# Patient Record
Sex: MALE | Employment: OTHER | URBAN - METROPOLITAN AREA
[De-identification: names, ages, dates, MRNs, and addresses within clinical notes are randomized per-mention and may not be internally consistent; named-entity substitution may affect disease eponyms.]

---

## 2017-01-09 ENCOUNTER — DOCTOR'S OFFICE (OUTPATIENT)
Dept: URBAN - METROPOLITAN AREA CLINIC 137 | Facility: CLINIC | Age: 72
Setting detail: OPHTHALMOLOGY
End: 2017-01-09
Payer: MEDICARE

## 2017-01-09 DIAGNOSIS — H25.13: ICD-10-CM

## 2017-01-09 PROCEDURE — 92014 COMPRE OPH EXAM EST PT 1/>: CPT | Performed by: OPHTHALMOLOGY

## 2017-01-09 ASSESSMENT — REFRACTION_MANIFEST
OD_AXIS: 144
OS_VA1: 20/25
OS_VA1: 20/
OD_VA1: 20/
OD_ADD: +2.50
OD_VA3: 20/
OD_VA1: 20/
OS_SPHERE: +1.75
OD_VA3: 20/
OU_VA: 20/
OS_ADD: +2.50
OD_CYLINDER: +2.50
OD_SPHERE: +1.50
OD_VA3: 20/
OS_VA2: 20/
OS_VA2: 20/
OD_VA2: 20/
OS_CYLINDER: +1.50
OD_VA1: 20/30
OS_VA3: 20/
OS_AXIS: 044
OU_VA: 20/
OD_VA2: 20/
OS_VA3: 20/
OS_VA3: 20/
OU_VA: 20/
OD_VA2: 20/
OS_VA1: 20/
OS_VA2: 20/

## 2017-01-09 ASSESSMENT — REFRACTION_CURRENTRX
OD_OVR_VA: 20/
OD_OVR_VA: 20/
OD_SPHERE: +1.50
OS_OVR_VA: 20/
OS_SPHERE: +1.75
OD_ADD: +2.50
OS_AXIS: 44
OD_CYLINDER: +2.50
OS_ADD: +2.50
OS_OVR_VA: 20/
OS_CYLINDER: +1.50
OD_OVR_VA: 20/
OS_OVR_VA: 20/
OD_AXIS: 144

## 2017-01-09 ASSESSMENT — SPHEQUIV_DERIVED
OD_SPHEQUIV: 2.75
OD_SPHEQUIV: 3.25
OS_SPHEQUIV: 2.5
OS_SPHEQUIV: 2.75

## 2017-01-09 ASSESSMENT — REFRACTION_AUTOREFRACTION
OS_SPHERE: +2.00
OS_AXIS: 45
OD_SPHERE: +2.00
OS_CYLINDER: +1.50
OD_CYLINDER: +2.50
OD_AXIS: 134

## 2017-01-09 ASSESSMENT — CONFRONTATIONAL VISUAL FIELD TEST (CVF)
OS_FINDINGS: FULL
OD_FINDINGS: FULL

## 2017-01-09 ASSESSMENT — VISUAL ACUITY
OD_BCVA: 20/20-2
OS_BCVA: 20/30-2

## 2018-03-06 ENCOUNTER — DOCTOR'S OFFICE (OUTPATIENT)
Dept: URBAN - METROPOLITAN AREA CLINIC 137 | Facility: CLINIC | Age: 73
Setting detail: OPHTHALMOLOGY
End: 2018-03-06
Payer: COMMERCIAL

## 2018-03-06 DIAGNOSIS — H52.4: ICD-10-CM

## 2018-03-06 PROCEDURE — 92014 COMPRE OPH EXAM EST PT 1/>: CPT | Performed by: OPHTHALMOLOGY

## 2018-03-06 ASSESSMENT — REFRACTION_MANIFEST
OU_VA: 20/
OS_CYLINDER: +1.50
OU_VA: 20/
OD_VA3: 20/
OS_AXIS: 044
OS_VA2: 20/
OD_SPHERE: +1.50
OD_VA3: 20/
OS_VA2: 20/
OD_VA2: 20/
OD_VA1: 20/30
OS_ADD: +2.50
OD_CYLINDER: +2.50
OS_SPHERE: +1.75
OD_VA2: 20/
OS_VA1: 20/25
OD_VA1: 20/
OS_VA3: 20/
OS_VA1: 20/
OS_VA3: 20/
OD_ADD: +2.50
OD_AXIS: 144

## 2018-03-06 ASSESSMENT — REFRACTION_CURRENTRX
OS_OVR_VA: 20/
OS_OVR_VA: 20/
OD_VPRISM_DIRECTION: BF
OS_OVR_VA: 20/
OD_OVR_VA: 20/
OD_CYLINDER: +2.50
OS_SPHERE: +1.75
OD_OVR_VA: 20/
OS_ADD: +2.50
OS_AXIS: 44
OD_ADD: +2.50
OD_SPHERE: +1.50
OS_CYLINDER: +1.50
OD_AXIS: 144
OD_OVR_VA: 20/
OS_VPRISM_DIRECTION: BF

## 2018-03-06 ASSESSMENT — REFRACTION_OUTSIDERX
OD_AXIS: 140
OS_VA2: 20/20(J1+)
OD_SPHERE: +1.50
OD_ADD: +2.50
OD_VA2: 20/20(J1+)
OS_AXIS: 040
OD_VA1: 20/30+3
OS_ADD: +2.50
OU_VA: 20/20
OD_VA3: 20/
OS_VA3: 20/
OS_VA1: 20/20-3
OS_CYLINDER: +1.50
OD_CYLINDER: +2.75
OS_SPHERE: +1.75

## 2018-03-06 ASSESSMENT — REFRACTION_AUTOREFRACTION
OD_SPHERE: +2.00
OS_CYLINDER: +1.50
OS_SPHERE: +1.75
OS_AXIS: 039
OD_AXIS: 139
OD_CYLINDER: +2.75

## 2018-03-06 ASSESSMENT — CONFRONTATIONAL VISUAL FIELD TEST (CVF)
OD_FINDINGS: FULL
OS_FINDINGS: FULL

## 2018-03-06 ASSESSMENT — SPHEQUIV_DERIVED
OD_SPHEQUIV: 2.75
OS_SPHEQUIV: 2.5
OS_SPHEQUIV: 2.5
OD_SPHEQUIV: 3.375

## 2018-03-06 ASSESSMENT — VISUAL ACUITY
OS_BCVA: 20/30+1
OD_BCVA: 20/20-2

## 2018-09-24 ENCOUNTER — DOCTOR'S OFFICE (OUTPATIENT)
Dept: URBAN - METROPOLITAN AREA CLINIC 137 | Facility: CLINIC | Age: 73
Setting detail: OPHTHALMOLOGY
End: 2018-09-24
Payer: MEDICARE

## 2018-09-24 DIAGNOSIS — H25.13: ICD-10-CM

## 2018-09-24 PROCEDURE — 92014 COMPRE OPH EXAM EST PT 1/>: CPT | Performed by: OPHTHALMOLOGY

## 2018-09-24 ASSESSMENT — REFRACTION_CURRENTRX
OD_OVR_VA: 20/
OD_AXIS: 144
OS_AXIS: 44
OS_SPHERE: +1.75
OS_VPRISM_DIRECTION: BF
OS_OVR_VA: 20/
OD_OVR_VA: 20/
OS_CYLINDER: +1.50
OD_ADD: +2.50
OD_VPRISM_DIRECTION: BF
OD_SPHERE: +1.50
OS_ADD: +2.50
OD_CYLINDER: +2.50
OD_OVR_VA: 20/

## 2018-09-24 ASSESSMENT — REFRACTION_MANIFEST
OS_VA3: 20/
OD_VA1: 20/30+3
OS_AXIS: 040
OS_VA1: 20/20-3
OS_VA3: 20/
OD_CYLINDER: +2.75
OS_SPHERE: +1.75
OD_CYLINDER: +2.50
OS_VA1: 20/25
OU_VA: 20/20
OD_VA2: 20/20(J1+)
OD_SPHERE: +1.50
OD_AXIS: 144
OD_VA3: 20/
OD_VA2: 20/
OU_VA: 20/
OS_VA2: 20/
OD_VA3: 20/
OD_SPHERE: +1.50
OS_ADD: +2.50
OD_ADD: +2.50
OD_VA1: 20/30
OS_ADD: +2.50
OD_AXIS: 140
OS_CYLINDER: +1.50
OS_SPHERE: +1.75
OD_ADD: +2.50
OS_AXIS: 044
OS_CYLINDER: +1.50
OS_VA2: 20/20(J1+)

## 2018-09-24 ASSESSMENT — REFRACTION_AUTOREFRACTION
OS_AXIS: 43
OD_CYLINDER: +2.50
OD_AXIS: 139
OS_SPHERE: +2.25
OD_SPHERE: +2.75
OS_CYLINDER: +2.00

## 2018-09-24 ASSESSMENT — CONFRONTATIONAL VISUAL FIELD TEST (CVF)
OD_FINDINGS: FULL
OS_FINDINGS: FULL

## 2018-09-24 ASSESSMENT — SPHEQUIV_DERIVED
OD_SPHEQUIV: 2.875
OS_SPHEQUIV: 2.5
OD_SPHEQUIV: 4
OS_SPHEQUIV: 2.5
OS_SPHEQUIV: 3.25
OD_SPHEQUIV: 2.75

## 2018-09-24 ASSESSMENT — VISUAL ACUITY
OD_BCVA: 20/30-1
OS_BCVA: 20/40-1

## 2019-04-08 RX ORDER — ATORVASTATIN CALCIUM 40 MG/1
40 TABLET, FILM COATED ORAL DAILY
COMMUNITY

## 2019-04-08 RX ORDER — ASPIRIN 81 MG/1
81 TABLET ORAL DAILY
COMMUNITY

## 2019-04-08 RX ORDER — METOPROLOL SUCCINATE 50 MG/1
25 TABLET, EXTENDED RELEASE ORAL DAILY
COMMUNITY

## 2019-04-11 ENCOUNTER — ANESTHESIA EVENT (OUTPATIENT)
Dept: PERIOP | Facility: HOSPITAL | Age: 74
End: 2019-04-11
Payer: COMMERCIAL

## 2019-04-12 ENCOUNTER — HOSPITAL ENCOUNTER (OUTPATIENT)
Facility: HOSPITAL | Age: 74
Setting detail: OUTPATIENT SURGERY
Discharge: HOME | End: 2019-04-12
Attending: SPECIALIST | Admitting: SPECIALIST
Payer: COMMERCIAL

## 2019-04-12 ENCOUNTER — ANESTHESIA (OUTPATIENT)
Dept: PERIOP | Facility: HOSPITAL | Age: 74
End: 2019-04-12
Payer: COMMERCIAL

## 2019-04-12 VITALS
SYSTOLIC BLOOD PRESSURE: 117 MMHG | DIASTOLIC BLOOD PRESSURE: 77 MMHG | RESPIRATION RATE: 18 BRPM | OXYGEN SATURATION: 96 % | TEMPERATURE: 96.9 F | HEART RATE: 55 BPM

## 2019-04-12 DEVICE — IMPLANTABLE DEVICE: Type: IMPLANTABLE DEVICE | Site: EAR | Status: FUNCTIONAL

## 2019-04-12 RX ORDER — ONDANSETRON 2 MG/ML
4 INJECTION INTRAMUSCULAR; INTRAVENOUS EVERY 6 HOURS PRN
Status: DISCONTINUED | OUTPATIENT
Start: 2019-04-12 | End: 2019-04-12 | Stop reason: HOSPADM

## 2019-04-12 RX ORDER — DEXAMETHASONE SODIUM PHOSPHATE 10 MG/ML
INJECTION, SOLUTION INTRAMUSCULAR; INTRAVENOUS AS NEEDED
Status: DISCONTINUED | OUTPATIENT
Start: 2019-04-12 | End: 2019-04-12 | Stop reason: SURG

## 2019-04-12 RX ORDER — LIDOCAINE HYDROCHLORIDE 10 MG/ML
1 INJECTION, SOLUTION INFILTRATION; PERINEURAL ONCE
Status: DISCONTINUED | OUTPATIENT
Start: 2019-04-12 | End: 2019-04-12 | Stop reason: HOSPADM

## 2019-04-12 RX ORDER — SODIUM CHLORIDE, SODIUM LACTATE, POTASSIUM CHLORIDE, CALCIUM CHLORIDE 600; 310; 30; 20 MG/100ML; MG/100ML; MG/100ML; MG/100ML
125 INJECTION, SOLUTION INTRAVENOUS CONTINUOUS
Status: DISCONTINUED | OUTPATIENT
Start: 2019-04-12 | End: 2019-04-12 | Stop reason: HOSPADM

## 2019-04-12 RX ORDER — ONDANSETRON 2 MG/ML
4 INJECTION INTRAMUSCULAR; INTRAVENOUS ONCE AS NEEDED
Status: DISCONTINUED | OUTPATIENT
Start: 2019-04-12 | End: 2019-04-12 | Stop reason: HOSPADM

## 2019-04-12 RX ORDER — OFLOXACIN 3 MG/ML
SOLUTION/ DROPS OPHTHALMIC AS NEEDED
Status: DISCONTINUED | OUTPATIENT
Start: 2019-04-12 | End: 2019-04-12 | Stop reason: HOSPADM

## 2019-04-12 RX ORDER — KETOROLAC TROMETHAMINE 30 MG/ML
INJECTION, SOLUTION INTRAMUSCULAR; INTRAVENOUS AS NEEDED
Status: DISCONTINUED | OUTPATIENT
Start: 2019-04-12 | End: 2019-04-12 | Stop reason: SURG

## 2019-04-12 RX ORDER — PROPOFOL 10 MG/ML
INJECTION, EMULSION INTRAVENOUS AS NEEDED
Status: DISCONTINUED | OUTPATIENT
Start: 2019-04-12 | End: 2019-04-12 | Stop reason: SURG

## 2019-04-12 RX ORDER — FENTANYL CITRATE/PF 50 MCG/ML
25 SYRINGE (ML) INJECTION
Status: DISCONTINUED | OUTPATIENT
Start: 2019-04-12 | End: 2019-04-12 | Stop reason: HOSPADM

## 2019-04-12 RX ORDER — ONDANSETRON 2 MG/ML
INJECTION INTRAMUSCULAR; INTRAVENOUS AS NEEDED
Status: DISCONTINUED | OUTPATIENT
Start: 2019-04-12 | End: 2019-04-12 | Stop reason: SURG

## 2019-04-12 RX ORDER — ACETAMINOPHEN 325 MG/1
650 TABLET ORAL EVERY 6 HOURS PRN
Status: DISCONTINUED | OUTPATIENT
Start: 2019-04-12 | End: 2019-04-12 | Stop reason: HOSPADM

## 2019-04-12 RX ORDER — MEPERIDINE HYDROCHLORIDE 25 MG/ML
25 INJECTION INTRAMUSCULAR; INTRAVENOUS; SUBCUTANEOUS AS NEEDED
Status: DISCONTINUED | OUTPATIENT
Start: 2019-04-12 | End: 2019-04-12 | Stop reason: HOSPADM

## 2019-04-12 RX ORDER — SODIUM CHLORIDE, SODIUM LACTATE, POTASSIUM CHLORIDE, CALCIUM CHLORIDE 600; 310; 30; 20 MG/100ML; MG/100ML; MG/100ML; MG/100ML
INJECTION, SOLUTION INTRAVENOUS CONTINUOUS PRN
Status: DISCONTINUED | OUTPATIENT
Start: 2019-04-12 | End: 2019-04-12 | Stop reason: SURG

## 2019-04-12 RX ADMIN — PROPOFOL 180 MG: 10 INJECTION, EMULSION INTRAVENOUS at 09:33

## 2019-04-12 RX ADMIN — SODIUM CHLORIDE, SODIUM LACTATE, POTASSIUM CHLORIDE, AND CALCIUM CHLORIDE: .6; .31; .03; .02 INJECTION, SOLUTION INTRAVENOUS at 08:04

## 2019-04-12 RX ADMIN — KETOROLAC TROMETHAMINE 15 MG: 30 INJECTION, SOLUTION INTRAMUSCULAR at 09:34

## 2019-04-12 RX ADMIN — SODIUM CHLORIDE, SODIUM LACTATE, POTASSIUM CHLORIDE, AND CALCIUM CHLORIDE 125 ML/HR: .6; .31; .03; .02 INJECTION, SOLUTION INTRAVENOUS at 08:07

## 2019-04-12 RX ADMIN — DEXAMETHASONE SODIUM PHOSPHATE 5 MG: 10 INJECTION, SOLUTION INTRAMUSCULAR; INTRAVENOUS at 09:44

## 2019-04-12 RX ADMIN — ONDANSETRON 4 MG: 2 INJECTION INTRAMUSCULAR; INTRAVENOUS at 09:44

## 2019-04-12 RX ADMIN — PROPOFOL 50 MG: 10 INJECTION, EMULSION INTRAVENOUS at 09:37

## 2019-04-12 RX ADMIN — PROPOFOL 100 MG: 10 INJECTION, EMULSION INTRAVENOUS at 09:40

## 2019-04-15 ENCOUNTER — DOCTOR'S OFFICE (OUTPATIENT)
Dept: URBAN - METROPOLITAN AREA CLINIC 137 | Facility: CLINIC | Age: 74
Setting detail: OPHTHALMOLOGY
End: 2019-04-15
Payer: COMMERCIAL

## 2019-04-15 DIAGNOSIS — H52.03: ICD-10-CM

## 2019-04-15 DIAGNOSIS — H52.4: ICD-10-CM

## 2019-04-15 DIAGNOSIS — H52.223: ICD-10-CM

## 2019-04-15 PROCEDURE — 92014 COMPRE OPH EXAM EST PT 1/>: CPT | Performed by: OPTOMETRIST

## 2019-04-15 ASSESSMENT — REFRACTION_MANIFEST
OS_VA1: 20/25
OD_SPHERE: +4.25
OD_ADD: +2.50
OD_CYLINDER: -2.50
OD_SPHERE: +4.75
OD_VA3: 20/
OS_AXIS: 135
OS_VA2: 20/20(J1+)
OS_SPHERE: +3.75
OD_CYLINDER: -2.75
OD_VA2: 20/
OU_VA: 20/20-
OD_VA2: 20/20(J1+)
OS_VA1: 20/25
OS_SPHERE: +3.25
OD_VA3: 20/
OS_SPHERE: +1.75
OS_AXIS: 044
OS_VA2: 20/
OS_VA3: 20/
OS_CYLINDER: -1.75
OD_AXIS: 144
OS_ADD: +2.50
OD_AXIS: 50
OD_ADD: +2.50
OU_VA: 20/
OS_VA3: 20/
OD_VA1: 20/30+3
OD_SPHERE: +1.50
OS_VA1: 20/20-3
OD_VA1: 20/30
OS_ADD: +2.50
OS_CYLINDER: +1.50
OS_AXIS: 130
OD_CYLINDER: +2.50
OD_ADD: +2.50
OD_AXIS: 050
OU_VA: 20/20
OD_VA1: 20/25
OS_CYLINDER: -1.50
OS_ADD: +2.50

## 2019-04-15 ASSESSMENT — CONFRONTATIONAL VISUAL FIELD TEST (CVF)
OD_FINDINGS: FULL
OS_FINDINGS: FULL

## 2019-04-15 ASSESSMENT — REFRACTION_CURRENTRX
OS_SPHERE: +3.25
OD_VPRISM_DIRECTION: BF
OS_VPRISM_DIRECTION: BF
OS_OVR_VA: 20/
OD_SPHERE: +4.00
OS_CYLINDER: -1.50
OS_OVR_VA: 20/
OD_OVR_VA: 20/
OS_OVR_VA: 20/
OS_ADD: +2.50
OD_OVR_VA: 20/
OD_OVR_VA: 20/
OD_CYLINDER: -2.50
OD_ADD: +2.75
OD_AXIS: 52
OS_AXIS: 139

## 2019-04-15 ASSESSMENT — SPHEQUIV_DERIVED
OS_SPHEQUIV: 2.5
OD_SPHEQUIV: 2.875
OD_SPHEQUIV: 2.75
OS_SPHEQUIV: 2.5
OS_SPHEQUIV: 2.875
OD_SPHEQUIV: 3.5
OS_SPHEQUIV: 2.875
OD_SPHEQUIV: 3.5

## 2019-04-15 ASSESSMENT — REFRACTION_AUTOREFRACTION
OD_SPHERE: +4.50
OD_AXIS: 42
OS_SPHERE: +3.75
OD_CYLINDER: -2.00
OS_AXIS: 135
OS_CYLINDER: -1.75

## 2019-04-15 ASSESSMENT — VISUAL ACUITY
OD_BCVA: 20/30-2
OS_BCVA: 20/30

## 2020-09-15 ENCOUNTER — OPTICAL OFFICE (OUTPATIENT)
Dept: URBAN - METROPOLITAN AREA CLINIC 146 | Facility: CLINIC | Age: 75
Setting detail: OPHTHALMOLOGY
End: 2020-09-15
Payer: COMMERCIAL

## 2020-09-15 ENCOUNTER — DOCTOR'S OFFICE (OUTPATIENT)
Dept: URBAN - METROPOLITAN AREA CLINIC 137 | Facility: CLINIC | Age: 75
Setting detail: OPHTHALMOLOGY
End: 2020-09-15
Payer: COMMERCIAL

## 2020-09-15 ENCOUNTER — OPTICAL OFFICE (OUTPATIENT)
Dept: URBAN - METROPOLITAN AREA CLINIC 146 | Facility: CLINIC | Age: 75
Setting detail: OPHTHALMOLOGY
End: 2020-09-15

## 2020-09-15 DIAGNOSIS — H52.03: ICD-10-CM

## 2020-09-15 DIAGNOSIS — H52.223: ICD-10-CM

## 2020-09-15 PROBLEM — H25.13 CATARACT NUCLEAR SCLEROSIS; BOTH EYES: Status: ACTIVE | Noted: 2017-01-09

## 2020-09-15 PROCEDURE — V2799 MISC VISION ITEM OR SERVICE: HCPCS | Performed by: OPTOMETRIST

## 2020-09-15 PROCEDURE — V2020 VISION SVCS FRAMES PURCHASES: HCPCS | Performed by: OPTOMETRIST

## 2020-09-15 PROCEDURE — V2203 LENS SPHCYL BIFOCAL 4.00D/.1: HCPCS | Performed by: OPTOMETRIST

## 2020-09-15 PROCEDURE — V2208 LENS SPHCY BIFOCAL 4.25-7/2.: HCPCS | Performed by: OPTOMETRIST

## 2020-09-15 PROCEDURE — 92014 COMPRE OPH EXAM EST PT 1/>: CPT | Performed by: OPTOMETRIST

## 2020-09-15 PROCEDURE — V2784 LENS POLYCARB OR EQUAL: HCPCS | Performed by: OPTOMETRIST

## 2020-09-15 PROCEDURE — V2744 TINT PHOTOCHROMATIC LENS/ES: HCPCS | Performed by: OPTOMETRIST

## 2020-09-15 ASSESSMENT — REFRACTION_CURRENTRX
OS_ADD: +2.50
OS_AXIS: 139
OD_ADD: +2.75
OS_SPHERE: +3.25
OD_VPRISM_DIRECTION: BF
OD_CYLINDER: -2.50
OD_AXIS: 52
OD_OVR_VA: 20/
OS_CYLINDER: -1.50
OS_VPRISM_DIRECTION: BF
OS_OVR_VA: 20/
OD_SPHERE: +4.00

## 2020-09-15 ASSESSMENT — REFRACTION_MANIFEST
OD_VA1: 20/25
OD_ADD: +2.50
OD_SPHERE: +5.25
OS_CYLINDER: -1.75
OS_SPHERE: +3.50
OS_ADD: +2.50
OS_CYLINDER: -1.50
OS_ADD: +2.50
OS_AXIS: 135
OD_ADD: +2.50
OU_VA: 20/20-
OS_AXIS: 130
OU_VA: 20/20
OD_AXIS: 50
OD_ADD: +2.50
OS_VA1: 20/20-3
OD_VA2: 20/20(J1+)
OD_SPHERE: +4.75
OD_CYLINDER: -2.50
OS_AXIS: 130
OD_VA1: 20/25
OS_VA1: 20/25
OS_VA1: 20/25
OS_SPHERE: +3.25
OD_SPHERE: +4.25
OS_VA2: 20/20(J1+)
OD_VA1: 20/30+3
OD_AXIS: 50
OD_CYLINDER: -2.75
OS_SPHERE: +3.75
OS_CYLINDER: -1.50
OD_AXIS: 050
OD_CYLINDER: -2.50
OS_ADD: +2.50

## 2020-09-15 ASSESSMENT — SPHEQUIV_DERIVED
OD_SPHEQUIV: 4
OD_SPHEQUIV: 3.5
OS_SPHEQUIV: 2.5
OS_SPHEQUIV: 2.875
OS_SPHEQUIV: 2.75
OD_SPHEQUIV: 2.875
OD_SPHEQUIV: 4
OS_SPHEQUIV: 3.625

## 2020-09-15 ASSESSMENT — REFRACTION_AUTOREFRACTION
OS_CYLINDER: -1.25
OS_SPHERE: +4.25
OD_AXIS: 45
OS_AXIS: 132
OD_SPHERE: +5.00
OD_CYLINDER: -2.00

## 2020-09-15 ASSESSMENT — CONFRONTATIONAL VISUAL FIELD TEST (CVF)
OD_FINDINGS: FULL
OS_FINDINGS: FULL

## 2020-09-15 ASSESSMENT — VISUAL ACUITY
OD_BCVA: 20/30-1
OS_BCVA: 20/40

## 2021-09-20 PROCEDURE — 88305 TISSUE EXAM BY PATHOLOGIST: CPT | Performed by: PATHOLOGY

## 2021-09-21 ENCOUNTER — LAB REQUISITION (OUTPATIENT)
Dept: LAB | Facility: HOSPITAL | Age: 76
End: 2021-09-21
Payer: COMMERCIAL

## 2021-09-21 DIAGNOSIS — Z80.0 FAMILY HISTORY OF MALIGNANT NEOPLASM OF DIGESTIVE ORGANS: ICD-10-CM

## 2021-09-21 DIAGNOSIS — K63.5 POLYP OF COLON: ICD-10-CM

## 2021-09-21 DIAGNOSIS — K57.30 DIVERTICULOSIS OF LARGE INTESTINE WITHOUT PERFORATION OR ABSCESS WITHOUT BLEEDING: ICD-10-CM

## 2023-11-24 NOTE — PROGRESS NOTES
Colon and Rectal Surgery   Royal Mckeon 70 y.o. male MRN: [id number]   Encounter: [id number]  11/28/23   1:06 PM        ASSESSMENT:    Barbara Bingham returns today, his chief complaint on office visit states rectal bleeding however he denies this categorically. He is having some bloating and constipation,pellet like stools for 3-4weeks, he is exhibiting no alarm signs, colonoscopy is current from 2021 with 5-year recall. Discussed with him that at this point I would only add dietary/lifestyle changes, he did state that he has recently changed to false teeth and may be eating differently. PLAN:  -High fiber diet/increased hydration, 20-30grams fiber per day, increased fruits/vegetables/psyllium(metamucil or konsyl 1 tbsp 1-2x/day)  -Colonoscopy due again 2026  -He has been having relief with Colace, much better coalescence of bowel movement, discussed with him to reduce his typical 3 cups of coffee, continue his hydration and increase fiber, he is to return if any new/recurrent symptoms      HPI  Royal Mckeon is a 70 y.o. male who is here for evaluation of constipation. Last colonoscopy performed on 9/20/2021, with a 5 year recall. Family history of colon cancer in brother.      Historical Information   Past Medical History:   Diagnosis Date    Hyperlipidemia     Hypertension     MI (myocardial infarction) ([address])      Past Surgical History:   Procedure Laterality Date    ANKLE FRACTURE SURGERY      BACK SURGERY      CHOLECYSTECTOMY      CORONARY STENT PLACEMENT      KNEE ARTHROSCOPY      left knee     [place] TYMPANOSTOMY GENERAL ANESTHESIA Right 4/12/2019    Procedure: MYRINGOTOMY W/ INSERTION VENTILATION TUBE EAR;  Surgeon: Chaparro Garcia MD;  Location: [facility];  Service: ENT       Meds/Allergies       Current Outpatient Medications:     aspirin (ECOTRIN LOW STRENGTH) 81 mg EC tablet, Take 81 mg by mouth daily, Disp: , Rfl:     atorvastatin (LIPITOR) 40 mg tablet, Take 40 mg by mouth daily, Disp: , Rfl: metoprolol succinate (TOPROL-XL) 50 mg 24 hr tablet, Take 25 mg by mouth daily, Disp: , Rfl:       Allergies   Allergen Reactions    Codeine Palpitations         Social History   Social History     Substance and Sexual Activity   Alcohol Use Never     Social History     Substance and Sexual Activity   Drug Use Never     Social History     Tobacco Use   Smoking Status Never   Smokeless Tobacco Never         Family History:   Family History   Problem Relation Age of Onset    Colon cancer Brother        Review of Systems   Constitutional: Negative. HENT: Negative. Eyes: Negative. Respiratory: Negative. Cardiovascular: Negative. Gastrointestinal:  Positive for constipation. Endocrine: Negative. Genitourinary: Negative. Musculoskeletal: Negative. Skin: Negative. Allergic/Immunologic: Negative. Neurological: Negative. Hematological: Negative. Psychiatric/Behavioral: Negative.          Objective     Current Vitals:   Vitals:    11/28/23 1121   BP: 118/68   Weight: 95.7 kg (211 lb)   Height: 6' 1" (1.854 m)     Physical Exam:  General:no distress  ENT:moist mucus membranes  Neck:supple  Pulm:no increased work of breathing, clear bilateral  CV:sinus  Abdomen:soft,nontender  Extremities:no edema

## 2023-11-28 ENCOUNTER — OFFICE VISIT (OUTPATIENT)
Age: 78
End: 2023-11-28
Payer: COMMERCIAL

## 2023-11-28 VITALS
SYSTOLIC BLOOD PRESSURE: 118 MMHG | DIASTOLIC BLOOD PRESSURE: 68 MMHG | WEIGHT: 211 LBS | BODY MASS INDEX: 27.96 KG/M2 | HEIGHT: 73 IN

## 2023-11-28 DIAGNOSIS — R19.8 CHANGE IN BOWEL FUNCTION: Primary | ICD-10-CM

## 2023-11-28 PROCEDURE — 99214 OFFICE O/P EST MOD 30 MIN: CPT | Performed by: COLON & RECTAL SURGERY

## 2023-11-28 NOTE — PATIENT INSTRUCTIONS
ASSESSMENT:    Macrina Centers returns today, his chief complaint on office visit states rectal bleeding however he denies this categorically. He is having some bloating and constipation,pellet like stools for 3-4weeks, he is exhibiting no alarm signs, colonoscopy is current from 2021 with 5-year recall. Discussed with him that at this point I would only add dietary/lifestyle changes, he did state that he has recently changed to false teeth and may be eating differently.     PLAN:  -High fiber diet/increased hydration, 20-30grams fiber per day, increased fruits/vegetables/psyllium(metamucil or konsyl 1 tbsp 1-2x/day)  -Colonoscopy due again 2026  -He has been having relief with Colace, much better coalescence of bowel movement, discussed with him to reduce his typical 3 cups of coffee, continue his hydration and increase fiber, he is to return if any new/recurrent symptoms

## (undated) DEVICE — SYRINGE 10ML LL

## (undated) DEVICE — BLADE MYRINGOTOMY 377121

## (undated) DEVICE — MAYO STAND COVER: Brand: CONVERTORS

## (undated) DEVICE — COTTON BALLS: Brand: DEROYAL

## (undated) DEVICE — GLOVE SRG BIOGEL 7.5

## (undated) DEVICE — 2000CC GUARDIAN II: Brand: GUARDIAN

## (undated) DEVICE — TUBING SUCTION 5MM X 12 FT

## (undated) DEVICE — COTTON ROLL,1 LB: Brand: CURITY

## (undated) DEVICE — GAUZE SPONGES,USP TYPE VII GAUZE, 12 PLY: Brand: CURITY